# Patient Record
Sex: FEMALE | Race: WHITE | NOT HISPANIC OR LATINO | Employment: FULL TIME | URBAN - METROPOLITAN AREA
[De-identification: names, ages, dates, MRNs, and addresses within clinical notes are randomized per-mention and may not be internally consistent; named-entity substitution may affect disease eponyms.]

---

## 2022-09-19 ENCOUNTER — OFFICE VISIT (OUTPATIENT)
Dept: URGENT CARE | Facility: CLINIC | Age: 42
End: 2022-09-19
Payer: COMMERCIAL

## 2022-09-19 VITALS
SYSTOLIC BLOOD PRESSURE: 151 MMHG | WEIGHT: 230 LBS | RESPIRATION RATE: 16 BRPM | HEART RATE: 114 BPM | OXYGEN SATURATION: 99 % | DIASTOLIC BLOOD PRESSURE: 93 MMHG | TEMPERATURE: 99 F

## 2022-09-19 DIAGNOSIS — J03.00 STREPTOCOCCAL TONSILLITIS: Primary | ICD-10-CM

## 2022-09-19 DIAGNOSIS — J02.9 SORE THROAT: ICD-10-CM

## 2022-09-19 DIAGNOSIS — R11.0 NAUSEA: ICD-10-CM

## 2022-09-19 LAB
CTP QC/QA: YES
S PYO RRNA THROAT QL PROBE: POSITIVE

## 2022-09-19 PROCEDURE — 87880 STREP A ASSAY W/OPTIC: CPT | Mod: QW,,, | Performed by: NURSE PRACTITIONER

## 2022-09-19 PROCEDURE — 1159F MED LIST DOCD IN RCRD: CPT | Mod: S$GLB,,, | Performed by: NURSE PRACTITIONER

## 2022-09-19 PROCEDURE — 1160F RVW MEDS BY RX/DR IN RCRD: CPT | Mod: S$GLB,,, | Performed by: NURSE PRACTITIONER

## 2022-09-19 PROCEDURE — 3077F SYST BP >= 140 MM HG: CPT | Mod: S$GLB,,, | Performed by: NURSE PRACTITIONER

## 2022-09-19 PROCEDURE — 3080F DIAST BP >= 90 MM HG: CPT | Mod: S$GLB,,, | Performed by: NURSE PRACTITIONER

## 2022-09-19 PROCEDURE — 3077F PR MOST RECENT SYSTOLIC BLOOD PRESSURE >= 140 MM HG: ICD-10-PCS | Mod: S$GLB,,, | Performed by: NURSE PRACTITIONER

## 2022-09-19 PROCEDURE — 1159F PR MEDICATION LIST DOCUMENTED IN MEDICAL RECORD: ICD-10-PCS | Mod: S$GLB,,, | Performed by: NURSE PRACTITIONER

## 2022-09-19 PROCEDURE — 1160F PR REVIEW ALL MEDS BY PRESCRIBER/CLIN PHARMACIST DOCUMENTED: ICD-10-PCS | Mod: S$GLB,,, | Performed by: NURSE PRACTITIONER

## 2022-09-19 PROCEDURE — 99204 PR OFFICE/OUTPT VISIT, NEW, LEVL IV, 45-59 MIN: ICD-10-PCS | Mod: S$GLB,,, | Performed by: NURSE PRACTITIONER

## 2022-09-19 PROCEDURE — 99204 OFFICE O/P NEW MOD 45 MIN: CPT | Mod: S$GLB,,, | Performed by: NURSE PRACTITIONER

## 2022-09-19 PROCEDURE — 87880 POCT RAPID STREP A: ICD-10-PCS | Mod: QW,,, | Performed by: NURSE PRACTITIONER

## 2022-09-19 PROCEDURE — 3080F PR MOST RECENT DIASTOLIC BLOOD PRESSURE >= 90 MM HG: ICD-10-PCS | Mod: S$GLB,,, | Performed by: NURSE PRACTITIONER

## 2022-09-19 RX ORDER — CLARITHROMYCIN 250 MG/1
250 TABLET, FILM COATED ORAL EVERY 12 HOURS
Qty: 20 TABLET | Refills: 0 | Status: SHIPPED | OUTPATIENT
Start: 2022-09-19 | End: 2022-09-29

## 2022-09-19 RX ORDER — ONDANSETRON 4 MG/1
4 TABLET, ORALLY DISINTEGRATING ORAL EVERY 8 HOURS PRN
Qty: 21 TABLET | Refills: 0 | Status: SHIPPED | OUTPATIENT
Start: 2022-09-19 | End: 2022-09-26

## 2022-09-19 NOTE — LETTER
September 19, 2022      [facility] [place]  [address]  [place] [address]  Phone: [phone]  Fax: [phone]       Patient: Eladia Miller   YOB: 1980  Date of Visit: 09/19/2022    To Whom It May Concern:    Aurelia Miller  was at [facility] on 09/19/2022. The patient may return to work/school on 09/20/2022  with no restrictions. If you have any questions or concerns, or if I can be of further assistance, please do not hesitate to contact me.    Sincerely,    Jessenia Cleveland NP

## 2022-09-19 NOTE — PROGRESS NOTES
Subjective:       Patient ID: Eladia Miller is a 42 y.o. female.    Vitals:  weight is 104.3 kg (230 lb). Her oral temperature is 98.8 °F (37.1 °C). Her blood pressure is 151/93 (abnormal) and her pulse is 114 (abnormal). Her respiration is 16 and oxygen saturation is 99%.     Chief Complaint: Sore Throat and Nausea    Eladia Miller presents to clinic with sore throat, trouble swallowing, headache that has been present since yesterday.    Sore Throat   This is a new problem. The current episode started yesterday. The problem has been unchanged. There has been no fever. Associated symptoms include headaches and trouble swallowing.     Constitution: Negative.   HENT:  Positive for sore throat and trouble swallowing.    Neck: neck negative.   Cardiovascular: Negative.    Eyes: Negative.    Respiratory: Negative.     Gastrointestinal: Negative.    Endocrine: negative.   Genitourinary: Negative.    Musculoskeletal: Negative.    Skin: Negative.    Neurological:  Positive for headaches.     Objective:      Physical Exam   Constitutional: She is oriented to person, place, and time. She appears well-developed. She is cooperative.  Non-toxic appearance. She appears ill. No distress.   HENT:   Head: Normocephalic and atraumatic.   Ears:   Right Ear: Hearing, external ear and ear canal normal. A middle ear effusion is present.   Left Ear: Hearing, external ear and ear canal normal. A middle ear effusion is present.   Nose: Nose normal. No mucosal edema, rhinorrhea or nasal deformity. No epistaxis. Right sinus exhibits no maxillary sinus tenderness and no frontal sinus tenderness. Left sinus exhibits no maxillary sinus tenderness and no frontal sinus tenderness.   Mouth/Throat: Uvula is midline and mucous membranes are normal. No trismus in the jaw. Normal dentition. No uvula swelling. Posterior oropharyngeal erythema present. No oropharyngeal exudate or posterior oropharyngeal edema. Tonsils are 3+ on the right. Tonsils are 3+ on the  left. Tonsillar exudate.   Eyes: Conjunctivae and lids are normal. No scleral icterus.   Neck: Trachea normal and phonation normal. Neck supple. No edema present. No erythema present. No neck rigidity present.   Cardiovascular: Normal rate, regular rhythm, normal heart sounds and normal pulses.   Pulmonary/Chest: Effort normal and breath sounds normal. No respiratory distress. She has no decreased breath sounds. She has no rhonchi.   Abdominal: Normal appearance.   Musculoskeletal: Normal range of motion.         General: No deformity. Normal range of motion.   Lymphadenopathy:        Head (right side): Submandibular and tonsillar adenopathy present.        Head (left side): Submandibular and tonsillar adenopathy present.     She has cervical adenopathy.        Right cervical: Superficial cervical adenopathy present.        Left cervical: Superficial cervical adenopathy present.   Neurological: She is alert and oriented to person, place, and time. She exhibits normal muscle tone. Coordination normal.   Skin: Skin is warm, dry, intact, not diaphoretic and not pale.   Psychiatric: Her speech is normal and behavior is normal. Judgment and thought content normal.   Nursing note and vitals reviewed.      Assessment:       1. Streptococcal tonsillitis    2. Sore throat    3. Nausea          Plan:         Streptococcal tonsillitis  -     clarithromycin (BIAXIN) 250 MG tablet; Take 1 tablet (250 mg total) by mouth every 12 (twelve) hours. for 10 days  Dispense: 20 tablet; Refill: 0    Sore throat  -     POCT rapid strep A  -     clarithromycin (BIAXIN) 250 MG tablet; Take 1 tablet (250 mg total) by mouth every 12 (twelve) hours. for 10 days  Dispense: 20 tablet; Refill: 0    Nausea  -     POCT rapid strep A  -     clarithromycin (BIAXIN) 250 MG tablet; Take 1 tablet (250 mg total) by mouth every 12 (twelve) hours. for 10 days  Dispense: 20 tablet; Refill: 0  -     ondansetron (ZOFRAN-ODT) 4 MG TbDL; Take 1 tablet (4 mg  total) by mouth every 8 (eight) hours as needed (nausea).  Dispense: 21 tablet; Refill: 0

## 2024-02-02 ENCOUNTER — OFFICE VISIT (OUTPATIENT)
Dept: FAMILY MEDICINE | Facility: CLINIC | Age: 44
End: 2024-02-02
Payer: COMMERCIAL

## 2024-02-02 ENCOUNTER — HOSPITAL ENCOUNTER (OUTPATIENT)
Dept: RADIOLOGY | Facility: CLINIC | Age: 44
Discharge: HOME | End: 2024-02-02
Attending: STUDENT IN AN ORGANIZED HEALTH CARE EDUCATION/TRAINING PROGRAM
Payer: COMMERCIAL

## 2024-02-02 VITALS
BODY MASS INDEX: 37.2 KG/M2 | TEMPERATURE: 98 F | OXYGEN SATURATION: 98 % | HEART RATE: 87 BPM | SYSTOLIC BLOOD PRESSURE: 124 MMHG | HEIGHT: 67 IN | WEIGHT: 237 LBS | DIASTOLIC BLOOD PRESSURE: 80 MMHG

## 2024-02-02 DIAGNOSIS — F32.1 CURRENT MODERATE EPISODE OF MAJOR DEPRESSIVE DISORDER, UNSPECIFIED WHETHER RECURRENT: ICD-10-CM

## 2024-02-02 DIAGNOSIS — E66.09 CLASS 2 OBESITY DUE TO EXCESS CALORIES WITH BODY MASS INDEX (BMI) OF 37.0 TO 37.9 IN ADULT, UNSPECIFIED WHETHER SERIOUS COMORBIDITY PRESENT: ICD-10-CM

## 2024-02-02 DIAGNOSIS — Z00.00 ENCOUNTER FOR ANNUAL GENERAL MEDICAL EXAMINATION WITHOUT ABNORMAL FINDINGS IN ADULT: Primary | ICD-10-CM

## 2024-02-02 DIAGNOSIS — Z12.31 SCREENING MAMMOGRAM FOR BREAST CANCER: ICD-10-CM

## 2024-02-02 DIAGNOSIS — Z12.4 ENCOUNTER FOR SCREENING FOR CERVICAL CANCER: ICD-10-CM

## 2024-02-02 DIAGNOSIS — F17.210 NICOTINE DEPENDENCE, CIGARETTES, UNCOMPLICATED: ICD-10-CM

## 2024-02-02 PROBLEM — E66.812 CLASS 2 OBESITY DUE TO EXCESS CALORIES WITH BODY MASS INDEX (BMI) OF 37.0 TO 37.9 IN ADULT: Status: ACTIVE | Noted: 2024-02-02

## 2024-02-02 PROCEDURE — 3074F SYST BP LT 130 MM HG: CPT | Mod: CPTII,S$GLB,, | Performed by: STUDENT IN AN ORGANIZED HEALTH CARE EDUCATION/TRAINING PROGRAM

## 2024-02-02 PROCEDURE — 77067 SCR MAMMO BI INCL CAD: CPT | Mod: TC,PO

## 2024-02-02 PROCEDURE — 77067 SCR MAMMO BI INCL CAD: CPT | Mod: 26,,, | Performed by: RADIOLOGY

## 2024-02-02 PROCEDURE — 77063 BREAST TOMOSYNTHESIS BI: CPT | Mod: 26,,, | Performed by: RADIOLOGY

## 2024-02-02 PROCEDURE — 1159F MED LIST DOCD IN RCRD: CPT | Mod: CPTII,S$GLB,, | Performed by: STUDENT IN AN ORGANIZED HEALTH CARE EDUCATION/TRAINING PROGRAM

## 2024-02-02 PROCEDURE — 99214 OFFICE O/P EST MOD 30 MIN: CPT | Mod: S$GLB,,, | Performed by: STUDENT IN AN ORGANIZED HEALTH CARE EDUCATION/TRAINING PROGRAM

## 2024-02-02 PROCEDURE — 87491 CHLMYD TRACH DNA AMP PROBE: CPT | Performed by: STUDENT IN AN ORGANIZED HEALTH CARE EDUCATION/TRAINING PROGRAM

## 2024-02-02 PROCEDURE — 99999 PR PBB SHADOW E&M-EST. PATIENT-LVL IV: CPT | Mod: PBBFAC,,, | Performed by: STUDENT IN AN ORGANIZED HEALTH CARE EDUCATION/TRAINING PROGRAM

## 2024-02-02 PROCEDURE — 3079F DIAST BP 80-89 MM HG: CPT | Mod: CPTII,S$GLB,, | Performed by: STUDENT IN AN ORGANIZED HEALTH CARE EDUCATION/TRAINING PROGRAM

## 2024-02-02 PROCEDURE — 3008F BODY MASS INDEX DOCD: CPT | Mod: CPTII,S$GLB,, | Performed by: STUDENT IN AN ORGANIZED HEALTH CARE EDUCATION/TRAINING PROGRAM

## 2024-02-02 PROCEDURE — 1160F RVW MEDS BY RX/DR IN RCRD: CPT | Mod: CPTII,S$GLB,, | Performed by: STUDENT IN AN ORGANIZED HEALTH CARE EDUCATION/TRAINING PROGRAM

## 2024-02-02 RX ORDER — BUPROPION HYDROCHLORIDE 150 MG/1
150 TABLET, EXTENDED RELEASE ORAL 2 TIMES DAILY
Qty: 60 TABLET | Refills: 0 | Status: SHIPPED | OUTPATIENT
Start: 2024-02-02 | End: 2024-02-19 | Stop reason: SDUPTHER

## 2024-02-02 RX ORDER — SEMAGLUTIDE 1.34 MG/ML
INJECTION, SOLUTION SUBCUTANEOUS
COMMUNITY
End: 2024-02-19 | Stop reason: ALTCHOICE

## 2024-02-02 NOTE — PROGRESS NOTES
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  She is presently being seen by weight loss clinic and provided semaglutide for weight loss supplementation.  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  She often feels that she spends more time alone at her home and does not often engage with  her family as she did once before.  She is interested in starting medication for improvement of her symptoms. She denies any chest pain, shortness of breath, abdominal pain, fevers, chills, nausea, vomiting, diarrhea or dysuria.     Last Mammogram: n/a  Last Pap smear: >3yrs.      PAST MEDICAL HISTORY:     274}  History reviewed. No pertinent past medical history.    PAST SURGICAL HISTORY:  Past Surgical History:   Procedure Laterality Date    CHOLECYSTECTOMY         SOCIAL HISTORY:  Social History     Socioeconomic History    Marital status:    Tobacco Use    Smoking status: Every Day     Current packs/day: 0.50     Types: Cigarettes    Smokeless tobacco: Never       FAMILY HISTORY:       Family History   Problem Relation Age of Onset    Hypertension Mother     Hypertension Father     Hyperlipidemia Father     Throat cancer Father        ALLERGIES AND MEDICATIONS: updated and reviewed.      274}  Review of patient's allergies indicates:   Allergen Reactions    Keflex [cephalexin] Hives    Pcn [penicillins] Hives     Medication List with Changes/Refills   New Medications    BUPROPION (WELLBUTRIN SR) 150 MG TBSR 12 HR TABLET    Take 1 tablet (150 mg total) by mouth 2 (two) times daily. Initial, 150 mg orally once daily in the morning for 3 days, may increase to 150 mg twice daily;   Current Medications    SEMAGLUTIDE (OZEMPIC) 1 MG/DOSE (2 MG/1.5 ML) PNIJ     "Inject into the skin every 7 days.       SCREENING HISTORY:    274}  Health Maintenance         Date Due Completion Date    Hepatitis C Screening Never done ---    Cervical Cancer Screening Never done ---    Lipid Panel Never done ---    COVID-19 Vaccine (1) Never done ---    Pneumococcal Vaccines (Age 0-64) (1 of 2 - PCV) Never done ---    HIV Screening Never done ---    TETANUS VACCINE Never done ---    Mammogram Never done ---    Hemoglobin A1c (Diabetic Prevention Screening) Never done ---    Influenza Vaccine (1) Never done ---            REVIEW OF SYSTEMS:   Review of Systems   Constitutional:  Negative for activity change, diaphoresis, fatigue, fever and unexpected weight change.   HENT:  Negative for postnasal drip and rhinorrhea.    Eyes:  Negative for photophobia and visual disturbance.   Respiratory:  Negative for cough, shortness of breath and wheezing.    Cardiovascular:  Negative for chest pain, palpitations and leg swelling.   Gastrointestinal:  Negative for abdominal distention, abdominal pain, constipation, diarrhea, nausea and vomiting.   Genitourinary:  Negative for bladder incontinence, difficulty urinating and frequency.   Musculoskeletal:  Negative for joint swelling and leg pain.   Integumentary:  Negative for pallor and rash.   Neurological:  Negative for dizziness, weakness and numbness.   All other systems reviewed and are negative.      PHYSICAL EXAM:      274}  /80 (BP Location: Right arm, Patient Position: Sitting, BP Method: Large (Manual))   Pulse 87   Temp 98 °F (36.7 °C)   Ht 5' 7" (1.702 m)   Wt 107.5 kg (236 lb 15.9 oz)   SpO2 98%   BMI 37.12 kg/m²   Wt Readings from Last 3 Encounters:   02/02/24 107.5 kg (236 lb 15.9 oz)   09/19/22 104.3 kg (230 lb)     BP Readings from Last 3 Encounters:   02/02/24 124/80   09/19/22 (!) 151/93     Estimated body mass index is 37.12 kg/m² as calculated from the following:    Height as of this encounter: 5' 7" (1.702 m).    Weight as of " "this encounter: 107.5 kg (236 lb 15.9 oz).     Physical Exam  Vitals reviewed.   Constitutional:       General: She is not in acute distress.     Appearance: Normal appearance. She is well-developed and normal weight. She is not ill-appearing.   HENT:      Head: Normocephalic and atraumatic.      Right Ear: External ear normal.      Left Ear: External ear normal.      Nose: Nose normal.   Eyes:      Extraocular Movements: Extraocular movements intact.      Conjunctiva/sclera: Conjunctivae normal.      Pupils: Pupils are equal, round, and reactive to light.   Neck:      Thyroid: No thyroid mass.   Cardiovascular:      Rate and Rhythm: Normal rate and regular rhythm.      Pulses: Normal pulses.      Heart sounds: Normal heart sounds, S1 normal and S2 normal. No murmur heard.     No gallop.   Pulmonary:      Effort: Pulmonary effort is normal. No respiratory distress.      Breath sounds: Normal breath sounds and air entry. No stridor. No wheezing, rhonchi or rales.   Abdominal:      General: Bowel sounds are normal. There is no distension.      Palpations: Abdomen is soft. There is no mass.      Tenderness: There is no abdominal tenderness.   Musculoskeletal:         General: No swelling or deformity. Normal range of motion.      Cervical back: Normal range of motion and neck supple. No edema or erythema.   Skin:     General: Skin is warm and dry.      Findings: No lesion or rash.   Neurological:      General: No focal deficit present.      Mental Status: She is alert and oriented to person, place, and time. Mental status is at baseline.   Psychiatric:         Mood and Affect: Mood normal.         Behavior: Behavior normal. Behavior is cooperative.         Judgment: Judgment normal.         LABS:   274}  I have reviewed old labs below:  No results found for: "WBC", "HGB", "HCT", "MCV", "PLT", "NA", "K", "CL", "CALCIUM", "PHOS", "CO2", "GLU", "BUN", "CREATININE", "ANIONGAP", "ESTGFRAFRICA", "EGFRNONAA", "PROT", " ""ALBUMIN", "BILITOT", "ALKPHOS", "ALT", "AST", "INR", "CHOL", "TRIG", "HDL", "LDLCALC", "TSH", "PSA", "GLUF", "HGBA1C", "MICROALBUR"    ASSESSMENT AND PLAN:  274}  1. Encounter for annual general medical examination without abnormal findings in adult  -     HIV 1/2 Ag/Ab (4th Gen); Future; Expected date: 02/02/2024  -     Hepatitis C Antibody; Future; Expected date: 02/02/2024  -     TSH; Future; Expected date: 02/02/2024  -     Lipid Panel; Future; Expected date: 02/02/2024  -     Hemoglobin A1C; Future; Expected date: 02/02/2024  -     Comprehensive Metabolic Panel; Future; Expected date: 02/02/2024  -     CBC Without Differential; Future; Expected date: 02/02/2024  -     Ferritin; Future; Expected date: 02/02/2024  -     C. trachomatis/N. gonorrhoeae by AMP DNA [facility]; Urine    2. Screening mammogram for breast cancer  -     Mammo Digital Screening Bilat w/ Jet; Future; Expected date: 02/02/2024    3. Encounter for screening for cervical cancer  -     Ambulatory referral/consult to Gynecology; Future; Expected date: 02/09/2024    4. Current moderate episode of major depressive disorder, unspecified whether recurrent  Comments:  PHQ-9: 10 points (moderate depression).  Will start trial of Wellbutrin SR 150mg, to assist with smoking cessation and curbing appetite  Orders:  -     buPROPion (WELLBUTRIN SR) 150 MG TBSR 12 hr tablet; Take 1 tablet (150 mg total) by mouth 2 (two) times daily. Initial, 150 mg orally once daily in the morning for 3 days, may increase to 150 mg twice daily;  Dispense: 60 tablet; Refill: 0    5. 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 Initial, 150 mg orally once daily in the morning for 3 days, may increase to 150 mg twice daily;  Dispense: " 60 tablet; Refill: 0    6. Class 2 obesity due to excess calories with body mass index (BMI) of 37.0 to 37.9 in adult, unspecified whether serious comorbidity present  Assessment & Plan:  Recommend exercise 3-4x per week, for 30-40mins, Low fat, Low carb, and Low calorie diet               Orders Placed This Encounter   Procedures    C. trachomatis/N. gonorrhoeae by AMP DNA [facility]; Urine    Mammo Digital Screening Bilat w/ Jet    HIV 1/2 Ag/Ab (4th Gen)    Hepatitis C Antibody    TSH    Lipid Panel    Hemoglobin A1C    Comprehensive Metabolic Panel    CBC Without Differential    Ferritin    Ambulatory referral/consult to Gynecology       Follow up in about 4 weeks (around 3/1/2024) for f/u use of Wellbutrin . or sooner as needed.

## 2024-02-05 DIAGNOSIS — R92.8 ABNORMAL MAMMOGRAM OF LEFT BREAST: Primary | ICD-10-CM

## 2024-02-08 LAB
C TRACH DNA SPEC QL NAA+PROBE: NOT DETECTED
N GONORRHOEA DNA SPEC QL NAA+PROBE: NOT DETECTED

## 2024-02-19 ENCOUNTER — PATIENT MESSAGE (OUTPATIENT)
Dept: FAMILY MEDICINE | Facility: CLINIC | Age: 44
End: 2024-02-19

## 2024-02-19 ENCOUNTER — OFFICE VISIT (OUTPATIENT)
Dept: FAMILY MEDICINE | Facility: CLINIC | Age: 44
End: 2024-02-19
Payer: COMMERCIAL

## 2024-02-19 VITALS
SYSTOLIC BLOOD PRESSURE: 136 MMHG | OXYGEN SATURATION: 97 % | RESPIRATION RATE: 18 BRPM | HEIGHT: 67 IN | BODY MASS INDEX: 36.95 KG/M2 | WEIGHT: 235.44 LBS | HEART RATE: 89 BPM | DIASTOLIC BLOOD PRESSURE: 84 MMHG

## 2024-02-19 DIAGNOSIS — F32.1 CURRENT MODERATE EPISODE OF MAJOR DEPRESSIVE DISORDER, UNSPECIFIED WHETHER RECURRENT: ICD-10-CM

## 2024-02-19 DIAGNOSIS — F17.210 NICOTINE DEPENDENCE, CIGARETTES, UNCOMPLICATED: ICD-10-CM

## 2024-02-19 DIAGNOSIS — Z71.3 WEIGHT LOSS COUNSELING, ENCOUNTER FOR: ICD-10-CM

## 2024-02-19 DIAGNOSIS — E66.9 OBESITY, CLASS II, BMI 35-39.9: Primary | ICD-10-CM

## 2024-02-19 PROCEDURE — 99213 OFFICE O/P EST LOW 20 MIN: CPT | Mod: S$GLB,,, | Performed by: FAMILY MEDICINE

## 2024-02-19 PROCEDURE — 3044F HG A1C LEVEL LT 7.0%: CPT | Mod: CPTII,S$GLB,, | Performed by: FAMILY MEDICINE

## 2024-02-19 PROCEDURE — 3075F SYST BP GE 130 - 139MM HG: CPT | Mod: CPTII,S$GLB,, | Performed by: FAMILY MEDICINE

## 2024-02-19 PROCEDURE — 1159F MED LIST DOCD IN RCRD: CPT | Mod: CPTII,S$GLB,, | Performed by: FAMILY MEDICINE

## 2024-02-19 PROCEDURE — 99999 PR PBB SHADOW E&M-EST. PATIENT-LVL IV: CPT | Mod: PBBFAC,,, | Performed by: FAMILY MEDICINE

## 2024-02-19 PROCEDURE — 1160F RVW MEDS BY RX/DR IN RCRD: CPT | Mod: CPTII,S$GLB,, | Performed by: FAMILY MEDICINE

## 2024-02-19 PROCEDURE — 3008F BODY MASS INDEX DOCD: CPT | Mod: CPTII,S$GLB,, | Performed by: FAMILY MEDICINE

## 2024-02-19 PROCEDURE — 3079F DIAST BP 80-89 MM HG: CPT | Mod: CPTII,S$GLB,, | Performed by: FAMILY MEDICINE

## 2024-02-19 RX ORDER — TIRZEPATIDE 2.5 MG/.5ML
2.5 INJECTION, SOLUTION SUBCUTANEOUS
Qty: 4 PEN | Refills: 2 | Status: SHIPPED | OUTPATIENT
Start: 2024-02-19 | End: 2024-03-20 | Stop reason: SDUPTHER

## 2024-02-19 RX ORDER — BUPROPION HYDROCHLORIDE 150 MG/1
150 TABLET, EXTENDED RELEASE ORAL 2 TIMES DAILY
Qty: 180 TABLET | Refills: 1 | Status: SHIPPED | OUTPATIENT
Start: 2024-02-19 | End: 2024-08-17

## 2024-02-19 NOTE — PROGRESS NOTES
Subjective:       Patient ID: Eladia Miller is a 43 y.o. female.    Chief Complaint: Follow-up    This patient is new to me.  Eladia Miller presents to the clinic today for follow up after starting wellbutrin. Patient states she feels better on this and is doing better with self-isolation. Patient reports she has noticed a little dry mouth and increased thirst with this.   Patient reports she was previously on Ozempic and discussed with Dr Mayfield about trying something different. Patient reports she is interested in this but is unsure if she can afford this.   Patient educated on plan of care, verbalized understanding.         Review of Systems   Constitutional:  Positive for activity change. Negative for appetite change, chills, diaphoresis, fever and unexpected weight change.   HENT:  Negative for congestion, ear pain, hearing loss, postnasal drip, rhinorrhea, sinus pressure, sneezing, sore throat and trouble swallowing.    Eyes:  Negative for pain, discharge, redness, itching and visual disturbance.   Respiratory:  Negative for apnea, cough, chest tightness, shortness of breath and wheezing.    Cardiovascular:  Negative for chest pain, palpitations and leg swelling.   Gastrointestinal:  Negative for abdominal distention, abdominal pain, blood in stool, constipation, diarrhea, nausea and vomiting.   Endocrine: Positive for polydipsia. Negative for polyuria.   Genitourinary:  Negative for difficulty urinating, dysuria, flank pain, frequency, hematuria and menstrual problem.   Skin:  Negative for color change, rash and wound.   Neurological:  Negative for dizziness.   Psychiatric/Behavioral:  Negative for confusion and dysphoric mood.        Patient Active Problem List   Diagnosis    Class 2 obesity due to excess calories with body mass index (BMI) of 37.0 to 37.9 in adult    Nicotine dependence, cigarettes, uncomplicated       Objective:      Physical Exam  Vitals reviewed.   Constitutional:       General: She is not in  "acute distress.     Appearance: Normal appearance. She is well-developed.   HENT:      Head: Normocephalic.      Nose: Nose normal.   Eyes:      Conjunctiva/sclera: Conjunctivae normal.      Pupils: Pupils are equal, round, and reactive to light.   Cardiovascular:      Rate and Rhythm: Normal rate and regular rhythm.      Heart sounds: Normal heart sounds.   Pulmonary:      Effort: Pulmonary effort is normal. No respiratory distress.      Breath sounds: Normal breath sounds.   Musculoskeletal:      Cervical back: Normal range of motion and neck supple.   Skin:     General: Skin is warm and dry.      Findings: No rash.   Neurological:      Mental Status: She is alert and oriented to person, place, and time.   Psychiatric:         Mood and Affect: Mood normal.         Behavior: Behavior normal.         Lab Results   Component Value Date    WBC 6.22 02/02/2024    HGB 12.8 02/02/2024    HCT 39.9 02/02/2024     02/02/2024    CHOL 136 02/02/2024    TRIG 90 02/02/2024    HDL 40 02/02/2024    ALT 28 02/02/2024    AST 25 02/02/2024     02/02/2024    K 3.9 02/02/2024     02/02/2024    CREATININE 0.9 02/02/2024    BUN 8 02/02/2024    CO2 20 (L) 02/02/2024    TSH 1.343 02/02/2024    HGBA1C 4.7 02/02/2024     The 10-year ASCVD risk score (Juanito BROWN, et al., 2019) is: 2.4%    Values used to calculate the score:      Age: 43 years      Sex: Female      Is Non- : No      Diabetic: No      Tobacco smoker: Yes      Systolic Blood Pressure: 136 mmHg      Is BP treated: No      HDL Cholesterol: 40 mg/dL      Total Cholesterol: 136 mg/dL  Visit Vitals  /84 (BP Location: Right arm, Patient Position: Sitting, BP Method: Medium (Manual))   Pulse 89   Resp 18   Ht 5' 7" (1.702 m)   Wt 106.8 kg (235 lb 7.2 oz)   SpO2 97%   BMI 36.88 kg/m²      Assessment:       1. Obesity, Class II, BMI 35-39.9    2. Weight loss counseling, encounter for    3. Current moderate episode of major depressive " disorder, unspecified whether recurrent        Plan:       Eladia was seen today for follow-up.    Diagnoses and all orders for this visit:    Obesity, Class II, BMI 35-39.9  -     tirzepatide, weight loss, (ZEPBOUND) 2.5 mg/0.5 mL PnIj; Inject 2.5 mg into the skin every 7 days.  Body mass index is 36.88 kg/m².  Continue healthy diet and regular exercise as tolerated.  Continue medications as prescribed.  Follow up with PCP    Weight loss counseling, encounter for  -     tirzepatide, weight loss, (ZEPBOUND) 2.5 mg/0.5 mL PnIj; Inject 2.5 mg into the skin every 7 days.  Discussed coupon online  Continue medications as prescribed.  Follow up with PCP    Current moderate episode of major depressive disorder, unspecified whether recurrent  Improved with wellbutrin  Continue medications as prescribed.  Follow up with PCP       Follow up in about 6 months (around 8/19/2024), or if symptoms worsen or fail to improve.           Tests to Keep You Healthy    Mammogram: Met on 2/2/2024  Cervical Cancer Screening: DUE

## 2024-02-19 NOTE — PATIENT INSTRUCTIONS
Savings Card, Cost & Coverage Support  Zepbound (tirzepatide) ([email or web address])   [email or web address]

## 2024-02-20 ENCOUNTER — TELEPHONE (OUTPATIENT)
Dept: FAMILY MEDICINE | Facility: CLINIC | Age: 44
End: 2024-02-20

## 2024-02-20 NOTE — TELEPHONE ENCOUNTER
" Medical Urgency  Received: Today  Penelope Lundy Staff  Cc: P [facility] Referral Follow Up  Good morning/Afternoon    As a courtesy for DOS , Procedure/Test  Procedures:     [id number] - MAMMO DIGITAL DIAGNOSTIC LEFT WITH MIGUE    Financial Call Center has not been able to contact patient.  Pt has a liability balance due of $0.00    Is this procedure medically urgent or non-medically urgent?    Please respond via In-basket or contact [facility] @ [phone]               Medical Urgency Definition    If you can Answer yes to one of the following questions, the  Case will be considered "Medically Urgent" and will be done as  Scheduled irrespective of whether [facility] will receive payment.    *If this particular case is not done as scheduled, would the patient  Experience loss of life?    *Loss of Limb?    *Irreversible loss of function?    *Would their condition significantly worsen?    If none of these questions have a yes answer, the case  Should be postponed until such a time as the finances  can be sorted out.    If this is not medically urgent, please contact the patient to reschedule until financial obligations can be met.    Thanks,  Penelope Lundy  Financial Clearance Department  [phone]    "

## 2024-02-21 ENCOUNTER — TELEPHONE (OUTPATIENT)
Dept: RADIOLOGY | Facility: HOSPITAL | Age: 44
End: 2024-02-21

## 2024-02-27 ENCOUNTER — TELEPHONE (OUTPATIENT)
Dept: RADIOLOGY | Facility: HOSPITAL | Age: 44
End: 2024-02-27

## 2024-02-27 NOTE — TELEPHONE ENCOUNTER
Assisting with box coverage:  This test is an urgent test as her condition could worsen without appropriate diagnosis. Please send back to financial clearance department.   Thanks,   Valeria Caicedo NP

## 2024-03-11 ENCOUNTER — PATIENT MESSAGE (OUTPATIENT)
Dept: ADMINISTRATIVE | Facility: HOSPITAL | Age: 44
End: 2024-03-11
Payer: COMMERCIAL

## 2024-03-20 DIAGNOSIS — E66.9 OBESITY, CLASS II, BMI 35-39.9: ICD-10-CM

## 2024-03-20 DIAGNOSIS — Z71.3 WEIGHT LOSS COUNSELING, ENCOUNTER FOR: ICD-10-CM

## 2024-03-21 RX ORDER — TIRZEPATIDE 2.5 MG/.5ML
2.5 INJECTION, SOLUTION SUBCUTANEOUS
Qty: 4 PEN | Refills: 2 | Status: SHIPPED | OUTPATIENT
Start: 2024-03-21 | End: 2024-06-19

## 2024-03-21 NOTE — TELEPHONE ENCOUNTER
Lov 2-19-24 Nov none noted at this time. Pt unable to start medication due to stock. Req a change in pharmacy

## 2024-03-25 ENCOUNTER — TELEPHONE (OUTPATIENT)
Dept: RADIOLOGY | Facility: HOSPITAL | Age: 44
End: 2024-03-25

## 2024-04-04 ENCOUNTER — PATIENT MESSAGE (OUTPATIENT)
Dept: ADMINISTRATIVE | Facility: HOSPITAL | Age: 44
End: 2024-04-04
Payer: COMMERCIAL

## 2024-05-02 ENCOUNTER — TELEPHONE (OUTPATIENT)
Dept: FAMILY MEDICINE | Facility: CLINIC | Age: 44
End: 2024-05-02
Payer: COMMERCIAL

## 2024-05-02 NOTE — TELEPHONE ENCOUNTER
----- Message from Kimberlee Verdugo sent at 5/2/2024  1:47 PM CDT -----  Contact: Insurance  Type:  Needs Medical Advice    Who Called: Insurance     Would the patient rather a call back or a response via [facility]? Call    Best Call Back Number: [phone]    Additional Information: Medication denied - not included in patient's plan     Case ref # [id number]    tirzepatide, weight loss, (ZEPBOUND) 2.5 mg/0.5 mL PnIj

## 2024-08-05 ENCOUNTER — PATIENT MESSAGE (OUTPATIENT)
Dept: ADMINISTRATIVE | Facility: HOSPITAL | Age: 44
End: 2024-08-05
Payer: COMMERCIAL

## 2024-09-14 ENCOUNTER — PATIENT MESSAGE (OUTPATIENT)
Dept: FAMILY MEDICINE | Facility: CLINIC | Age: 44
End: 2024-09-14
Payer: COMMERCIAL

## 2024-09-16 NOTE — TELEPHONE ENCOUNTER
LOV--2-19-24 (CAROLYN Veloz)--2-2-24 (Dr. Mayfield)  Please see attached portal message from patient. Thank you.

## 2024-09-16 NOTE — TELEPHONE ENCOUNTER
Can we have her to make an appointment for follow-up on this medication.  She can make a virtual appointment and we can discuss this change. It has been some time since this medication was started. We generally like to have a follow-up no later than 30 days after starting a new medication, to safely continue these medicines also

## 2024-10-31 ENCOUNTER — OFFICE VISIT (OUTPATIENT)
Dept: FAMILY MEDICINE | Facility: CLINIC | Age: 44
End: 2024-10-31
Payer: COMMERCIAL

## 2024-10-31 DIAGNOSIS — F32.1 CURRENT MODERATE EPISODE OF MAJOR DEPRESSIVE DISORDER, UNSPECIFIED WHETHER RECURRENT: Primary | ICD-10-CM

## 2024-10-31 PROCEDURE — 1159F MED LIST DOCD IN RCRD: CPT | Mod: CPTII,95,,

## 2024-10-31 PROCEDURE — 3044F HG A1C LEVEL LT 7.0%: CPT | Mod: CPTII,95,,

## 2024-10-31 PROCEDURE — 1160F RVW MEDS BY RX/DR IN RCRD: CPT | Mod: CPTII,95,,

## 2024-10-31 PROCEDURE — 99212 OFFICE O/P EST SF 10 MIN: CPT | Mod: 95,,,

## 2024-10-31 RX ORDER — BUPROPION HYDROCHLORIDE 300 MG/1
300 TABLET ORAL DAILY
Qty: 30 TABLET | Refills: 11 | Status: SHIPPED | OUTPATIENT
Start: 2024-10-31 | End: 2025-10-31

## 2024-10-31 RX ORDER — METFORMIN HYDROCHLORIDE 500 MG/1
500 TABLET, EXTENDED RELEASE ORAL
COMMUNITY

## 2024-11-13 ENCOUNTER — OFFICE VISIT (OUTPATIENT)
Dept: URGENT CARE | Facility: CLINIC | Age: 44
End: 2024-11-13
Payer: COMMERCIAL

## 2024-11-13 VITALS
HEIGHT: 67 IN | RESPIRATION RATE: 20 BRPM | DIASTOLIC BLOOD PRESSURE: 82 MMHG | OXYGEN SATURATION: 98 % | HEART RATE: 101 BPM | BODY MASS INDEX: 36.1 KG/M2 | TEMPERATURE: 100 F | SYSTOLIC BLOOD PRESSURE: 113 MMHG | WEIGHT: 230 LBS

## 2024-11-13 DIAGNOSIS — R50.9 FEVER OF UNKNOWN ORIGIN: Primary | ICD-10-CM

## 2024-11-13 DIAGNOSIS — B34.9 ACUTE VIRAL SYNDROME: ICD-10-CM

## 2024-11-13 LAB
CTP QC/QA: YES
FLUAV AG NPH QL: NEGATIVE
FLUBV AG NPH QL: NEGATIVE
S PYO RRNA THROAT QL PROBE: NEGATIVE
SARS-COV-2 AG RESP QL IA.RAPID: NEGATIVE

## 2024-11-13 RX ORDER — METFORMIN HYDROCHLORIDE 500 MG/1
TABLET ORAL
COMMUNITY
Start: 2024-10-17

## 2024-11-13 NOTE — LETTER
November 13, 2024      [facility] [place]  [address]  [place] [address]  Phone: [phone]  Fax: [phone]       Patient: Eladia Miller   YOB: 1980  Date of Visit: 11/13/2024    To Whom It May Concern:    Aurelia Miller  was at [facility] on 11/13/2024. The patient may return to work/school on 11/15/2024 with no restrictions. If you have any questions or concerns, or if I can be of further assistance, please do not hesitate to contact me.    Sincerely,    Graham Simms NP

## 2024-11-13 NOTE — PROGRESS NOTES
"Subjective:      Patient ID: Eladia Miller is a 44 y.o. female.    Vitals:  height is 5' 7" (1.702 m) and weight is 104.3 kg (230 lb). Her temperature is 100.2 °F (37.9 °C). Her blood pressure is 113/82 and her pulse is 101. Her respiration is 20 and oxygen saturation is 98%.     Chief Complaint: Fever    Patient is a 44-year-old female with a past medical history depression who presents to clinic for evaluation fever.  Patient reports flu-like or COVID like symptoms.  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  Patient reports symptoms began yesterday.  Patient reports over-the-counter medications with only mild temporary relief to symptoms.  Patient reports she has experienced fatigue, chills, fever with a temperature max of 101.4° F, intermittent nausea, body aches, appetite change, and headaches.  Patient denies any ear pain or sore throat, nasal sinus congestion, chest pain or palpitations, shortness of breath or cough, abdominal pain, vomiting, urinary symptoms, rash, dizziness, or change in mentation.    Fever   This is a new problem. The current episode started 1 day ago. The problem has been gradually worsening. The maximum temperature noted was 101 to 101.9 F. Associated symptoms include diarrhea, headaches, muscle aches and nausea. Pertinent negatives include no abdominal pain, chest pain, congestion, coughing, ear pain, rash, sore throat or vomiting. She has tried acetaminophen and NSAIDs for the symptoms. The treatment provided mild relief.       Constitution: Positive for appetite change, chills, fatigue and fever (Temperature max 101.4F).   HENT:  Negative for ear pain, congestion and sore throat.    Neck: neck negative.   Cardiovascular: Negative.  Negative for chest pain and palpitations.   Eyes: Negative.    Respiratory: Negative.  Negative for chest tightness, cough and shortness of breath.    Gastrointestinal:  Positive for nausea and diarrhea. Negative for abdominal pain and " vomiting.   Endocrine: negative.   Genitourinary: Negative.  Negative for dysuria, frequency and urgency.   Musculoskeletal:  Positive for muscle ache.   Skin: Negative.  Negative for color change, pale, rash and erythema.   Allergic/Immunologic: Negative.    Neurological:  Positive for headaches. Negative for dizziness, light-headedness, passing out, disorientation and altered mental status.   Hematologic/Lymphatic: Negative.    Psychiatric/Behavioral: Negative.  Negative for altered mental status, disorientation and confusion.       Objective:     Physical Exam   Constitutional: She is oriented to person, place, and time. She appears well-developed. She is cooperative.  Non-toxic appearance. She does not appear ill. No distress.   HENT:   Head: Normocephalic and atraumatic.   Ears:   Right Ear: Hearing, tympanic membrane, external ear and ear canal normal.   Left Ear: Hearing, tympanic membrane, external ear and ear canal normal.   Nose: Nose normal. No mucosal edema, rhinorrhea, nasal deformity or congestion. No epistaxis. Right sinus exhibits no maxillary sinus tenderness and no frontal sinus tenderness. Left sinus exhibits no maxillary sinus tenderness and no frontal sinus tenderness.   Mouth/Throat: Uvula is midline, oropharynx is clear and moist and mucous membranes are normal. Mucous membranes are moist. No trismus in the jaw. Normal dentition. No uvula swelling. No oropharyngeal exudate or posterior oropharyngeal erythema. Oropharynx is clear.   Eyes: Conjunctivae and lids are normal. Pupils are equal, round, and reactive to light. Right eye exhibits no discharge. Left eye exhibits no discharge. No scleral icterus.   Neck: Trachea normal and phonation normal. Neck supple. No neck rigidity present.   Cardiovascular: Regular rhythm, normal heart sounds and normal pulses. Tachycardia present.   Pulmonary/Chest: Effort normal and breath sounds normal. No respiratory distress. She has no wheezes. She has no  rhonchi. She has no rales.   Abdominal: Normal appearance and bowel sounds are normal. She exhibits no distension. Soft. There is no abdominal tenderness. There is no rebound, no guarding, no left CVA tenderness and no right CVA tenderness.   Musculoskeletal: Normal range of motion.         General: Normal range of motion.      Cervical back: She exhibits no tenderness.   Lymphadenopathy:     She has no cervical adenopathy.   Neurological: She is alert and oriented to person, place, and time. She exhibits normal muscle tone.   Skin: Skin is warm, dry, intact, not diaphoretic, not pale and no rash. Capillary refill takes less than 2 seconds. No erythema   Psychiatric: Her speech is normal and behavior is normal. Judgment and thought content normal.   Nursing note and vitals reviewed.      Assessment:     1. Fever of unknown origin    2. Acute viral syndrome        Plan:       Fever of unknown origin  -     SARS Coronavirus 2 Antigen, POCT Manual Read  -     POCT Influenza A/B Rapid Antigen  -     POCT rapid strep A    Acute viral syndrome                Physical exam unremarkable with exception of slight tachycardia at 101 beats per minute.  Labs: Influenza a and B negative.  COVID negative.  Rapid strep negative.    Recommend retesting for COVID if home test available within 24-48 hours.    Tylenol/Motrin per package instructions for any pain or fever.    Assure adequate hydration.    Follow-up with PCP in 1-2 days.    Return to clinic as needed.    To ED for any new or acutely worsening symptoms.    Patient in agreement with plan of care.    Work excuse provided.      DISCLAIMER: Please note that my documentation in this Electronic Healthcare Record was produced using speech recognition software and therefore may contain errors related to that software system.These could include grammar, punctuation and spelling errors or the inclusion/exclusion of phrases that were not intended. Garbled syntax, mangled pronouns,  and other bizarre constructions may be attributed to that software system.